# Patient Record
Sex: FEMALE | Race: WHITE | Employment: STUDENT | URBAN - METROPOLITAN AREA
[De-identification: names, ages, dates, MRNs, and addresses within clinical notes are randomized per-mention and may not be internally consistent; named-entity substitution may affect disease eponyms.]

---
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
## 2024-07-27 ENCOUNTER — TELEPHONE (OUTPATIENT)
Dept: FAMILY MEDICINE CLINIC | Facility: CLINIC | Age: 19
End: 2024-07-27

## 2025-02-06 DIAGNOSIS — F41.9 ANXIETY: ICD-10-CM

## 2025-02-11 RX ORDER — PROPRANOLOL HYDROCHLORIDE 10 MG/1
10 TABLET ORAL 2 TIMES DAILY
Qty: 180 TABLET | Refills: 0 | Status: SHIPPED | OUTPATIENT
Start: 2025-02-11

## 2025-02-11 NOTE — TELEPHONE ENCOUNTER
Please review; protocol failed/No Protocol    Patient comment: Bird Lucio - I would like a refill for this medication.  I am having a lot of anxiety at school.  My next appointment with you is in March when I am home for break.  Thank you     Last Office Visit: 05/24/2023  Future Appointments   Date Time Provider Department Center   4/18/2025  2:30 PM Aileen Dixon MD [facility]       Requested Prescriptions   Pending Prescriptions Disp Refills    propranolol 10 MG Oral Tab 180 tablet 1     Sig: Take 1 tablet (10 mg total) by mouth 2 (two) times daily.       Hypertension Medications Protocol Failed - 2/11/2025  9:55 AM        Failed - CMP or BMP in past 12 months        Failed - EGFRCR or GFRNAA > 50     GFR Evaluation            Passed - Last BP reading less than 140/90     BP Readings from Last 1 Encounters:   07/05/23 106/72               Passed - In person appointment or virtual visit in the past 12 mos or appointment in next 3 mos     Recent Outpatient Visits              1 year ago Vaginal odor    [facility] Domenica Huitron APRN    Office Visit    1 year ago Well adult exam    [facility] Aileen Dixon MD    Office Visit    3 years ago Healthy child on routine physical examination    [facility] Melissa Cross, DO    Office Visit    3 years ago Abdominal pain, unspecified abdominal location    [facility] [place], Michelle, DO    Office Visit    3 years ago [facility], [facility]    [facility]          Future Appointments         Provider Department Appt Notes    In 2 months Aileen Dixon MD [facility] annual physical                    Passed - Medication is  active on med list           Future Appointments         Provider Department Appt Notes    In 2 months Aileen Dixon MD [facility] annual physical          Recent Outpatient Visits              1 year ago Vaginal odor    [facility] Domenica Huitron, APRN    Office Visit    1 year ago Well adult exam    [facility] Aileen Dixon MD    Office Visit    3 years ago Healthy child on routine physical examination    [facility] Melissa Cross, DO    Office Visit    3 years ago Abdominal pain, unspecified abdominal location    [place], [place], Michelle, DO    Office Visit    3 years ago Abdominal pain, unspecified abdominal location    [place] [place], Michelle, DO    Office Visit

## 2025-04-18 ENCOUNTER — LAB ENCOUNTER (OUTPATIENT)
Dept: LAB | Age: 20
End: 2025-04-18
Attending: STUDENT IN AN ORGANIZED HEALTH CARE EDUCATION/TRAINING PROGRAM
Payer: COMMERCIAL

## 2025-04-18 ENCOUNTER — OFFICE VISIT (OUTPATIENT)
Dept: FAMILY MEDICINE CLINIC | Facility: CLINIC | Age: 20
End: 2025-04-18
Payer: COMMERCIAL

## 2025-04-18 VITALS
DIASTOLIC BLOOD PRESSURE: 81 MMHG | OXYGEN SATURATION: 97 % | HEART RATE: 79 BPM | BODY MASS INDEX: 34.23 KG/M2 | WEIGHT: 186 LBS | SYSTOLIC BLOOD PRESSURE: 120 MMHG | TEMPERATURE: 99 F | HEIGHT: 62 IN

## 2025-04-18 DIAGNOSIS — Z00.00 WELL ADULT EXAM: ICD-10-CM

## 2025-04-18 DIAGNOSIS — Z30.017 NEXPLANON INSERTION: ICD-10-CM

## 2025-04-18 DIAGNOSIS — Z00.00 WELL ADULT EXAM: Primary | ICD-10-CM

## 2025-04-18 DIAGNOSIS — Z80.8 FAMILY HISTORY OF THYROID CANCER: ICD-10-CM

## 2025-04-18 DIAGNOSIS — R10.13 DYSPEPSIA: ICD-10-CM

## 2025-04-18 DIAGNOSIS — F41.9 ANXIETY: ICD-10-CM

## 2025-04-18 DIAGNOSIS — Z30.09 ENCOUNTER FOR COUNSELING REGARDING CONTRACEPTION: ICD-10-CM

## 2025-04-18 LAB
ALBUMIN SERPL-MCNC: 5 G/DL (ref 3.2–4.8)
ALBUMIN/GLOB SERPL: 1.8 {RATIO} (ref 1–2)
ALP LIVER SERPL-CCNC: 82 U/L (ref 52–144)
ALT SERPL-CCNC: 17 U/L (ref 10–49)
ANION GAP SERPL CALC-SCNC: 12 MMOL/L (ref 0–18)
AST SERPL-CCNC: 19 U/L (ref ?–34)
BILIRUB SERPL-MCNC: 0.5 MG/DL (ref 0.3–1.2)
BUN BLD-MCNC: 12 MG/DL (ref 9–23)
BUN/CREAT SERPL: 14.6 (ref 10–20)
CALCIUM BLD-MCNC: 9.7 MG/DL (ref 8.7–10.4)
CHLORIDE SERPL-SCNC: 102 MMOL/L (ref 98–112)
CHOLEST SERPL-MCNC: 167 MG/DL (ref ?–200)
CO2 SERPL-SCNC: 25 MMOL/L (ref 21–32)
CONTROL LINE PRESENT WITH A CLEAR BACKGROUND (YES/NO): YES YES/NO
CREAT BLD-MCNC: 0.82 MG/DL (ref 0.55–1.02)
DEPRECATED RDW RBC AUTO: 39.2 FL (ref 35.1–46.3)
EGFRCR SERPLBLD CKD-EPI 2021: 105 ML/MIN/1.73M2 (ref 60–?)
ERYTHROCYTE [DISTWIDTH] IN BLOOD BY AUTOMATED COUNT: 12.3 % (ref 11–15)
EST. AVERAGE GLUCOSE BLD GHB EST-MCNC: 94 MG/DL (ref 68–126)
FASTING PATIENT LIPID ANSWER: YES
FASTING STATUS PATIENT QL REPORTED: YES
GLOBULIN PLAS-MCNC: 2.8 G/DL (ref 2–3.5)
GLUCOSE BLD-MCNC: 103 MG/DL (ref 70–99)
HBA1C MFR BLD: 4.9 % (ref ?–5.7)
HCT VFR BLD AUTO: 39.1 % (ref 35–48)
HDLC SERPL-MCNC: 47 MG/DL (ref 40–59)
HGB BLD-MCNC: 13.6 G/DL (ref 12–16)
KIT LOT #: NORMAL NUMERIC
LDLC SERPL CALC-MCNC: 99 MG/DL (ref ?–100)
MCH RBC QN AUTO: 30.3 PG (ref 26–34)
MCHC RBC AUTO-ENTMCNC: 34.8 G/DL (ref 31–37)
MCV RBC AUTO: 87.1 FL (ref 80–100)
NONHDLC SERPL-MCNC: 120 MG/DL (ref ?–130)
OSMOLALITY SERPL CALC.SUM OF ELEC: 288 MOSM/KG (ref 275–295)
PLATELET # BLD AUTO: 425 10(3)UL (ref 150–450)
POTASSIUM SERPL-SCNC: 4 MMOL/L (ref 3.5–5.1)
PROT SERPL-MCNC: 7.8 G/DL (ref 5.7–8.2)
RBC # BLD AUTO: 4.49 X10(6)UL (ref 3.8–5.3)
SODIUM SERPL-SCNC: 139 MMOL/L (ref 136–145)
THYROPEROXIDASE AB SERPL-ACNC: 50 U/ML (ref ?–60)
TRIGL SERPL-MCNC: 115 MG/DL (ref 30–149)
TSI SER-ACNC: 1.41 UIU/ML (ref 0.55–4.78)
VIT D+METAB SERPL-MCNC: 16.7 NG/ML (ref 30–100)
VLDLC SERPL CALC-MCNC: 19 MG/DL (ref 0–30)
WBC # BLD AUTO: 9.1 X10(3) UL (ref 4–11)

## 2025-04-18 PROCEDURE — 86376 MICROSOMAL ANTIBODY EACH: CPT

## 2025-04-18 PROCEDURE — 80053 COMPREHEN METABOLIC PANEL: CPT | Performed by: STUDENT IN AN ORGANIZED HEALTH CARE EDUCATION/TRAINING PROGRAM

## 2025-04-18 PROCEDURE — 99213 OFFICE O/P EST LOW 20 MIN: CPT | Performed by: STUDENT IN AN ORGANIZED HEALTH CARE EDUCATION/TRAINING PROGRAM

## 2025-04-18 PROCEDURE — 11981 INSERTION DRUG DLVR IMPLANT: CPT | Performed by: STUDENT IN AN ORGANIZED HEALTH CARE EDUCATION/TRAINING PROGRAM

## 2025-04-18 PROCEDURE — 84443 ASSAY THYROID STIM HORMONE: CPT | Performed by: STUDENT IN AN ORGANIZED HEALTH CARE EDUCATION/TRAINING PROGRAM

## 2025-04-18 PROCEDURE — 99395 PREV VISIT EST AGE 18-39: CPT | Performed by: STUDENT IN AN ORGANIZED HEALTH CARE EDUCATION/TRAINING PROGRAM

## 2025-04-18 PROCEDURE — 85027 COMPLETE CBC AUTOMATED: CPT | Performed by: STUDENT IN AN ORGANIZED HEALTH CARE EDUCATION/TRAINING PROGRAM

## 2025-04-18 PROCEDURE — 36415 COLL VENOUS BLD VENIPUNCTURE: CPT | Performed by: STUDENT IN AN ORGANIZED HEALTH CARE EDUCATION/TRAINING PROGRAM

## 2025-04-18 PROCEDURE — 82306 VITAMIN D 25 HYDROXY: CPT

## 2025-04-18 PROCEDURE — 81025 URINE PREGNANCY TEST: CPT | Performed by: STUDENT IN AN ORGANIZED HEALTH CARE EDUCATION/TRAINING PROGRAM

## 2025-04-18 PROCEDURE — 83036 HEMOGLOBIN GLYCOSYLATED A1C: CPT | Performed by: STUDENT IN AN ORGANIZED HEALTH CARE EDUCATION/TRAINING PROGRAM

## 2025-04-18 PROCEDURE — 80061 LIPID PANEL: CPT | Performed by: STUDENT IN AN ORGANIZED HEALTH CARE EDUCATION/TRAINING PROGRAM

## 2025-04-18 RX ORDER — OMEPRAZOLE 40 MG/1
40 CAPSULE, DELAYED RELEASE ORAL DAILY
Qty: 90 CAPSULE | Refills: 1 | Status: SHIPPED | OUTPATIENT
Start: 2025-04-18

## 2025-04-18 RX ORDER — BUPROPION HYDROCHLORIDE 150 MG/1
150 TABLET ORAL DAILY
Qty: 30 TABLET | Refills: 0 | Status: SHIPPED | OUTPATIENT
Start: 2025-04-18

## 2025-04-18 NOTE — PROGRESS NOTES
HPI:    Patient ID: Maura Gannon is a 19 year old female.    HPI  Pt presenting for routine physical exam. Denies any acute issues or recent illnesses. No significant chronic medical problems. Past medical/surgical history, family history, and social history were reviewed.     Interested in birth control options  No prior contraception    Mild GERD  Omeprazole daily  Triggered by acid, alcohol  Chronic nausea    H/o anxiety  Lexapro numb  Overwhelmed  No therapy  Journaling  Has friends  Going to bed/waking up early  Communications/graphic design  Denies SH/SI/HI      Review of Systems   A comprehensive 10 point review of systems was completed.  Pertinent positives and negatives noted in the the HPI.     Current Medications[1]  Allergies:Allergies[2]   Vitals:    04/18/25 1420   BP: 120/81   Pulse: 79   Temp: 98.7 °F (37.1 °C)   SpO2: 97%   Weight: 186 lb (84.4 kg)   Height: 5' 2\" (1.575 m)       Body mass index is 34.02 kg/m².   PHYSICAL EXAM:   Physical Exam  Vitals reviewed.   Constitutional:       General: She is not in acute distress.     Appearance: Normal appearance. She is well-developed.   HENT:      Head: Normocephalic and atraumatic.      Right Ear: Tympanic membrane, ear canal and external ear normal.      Left Ear: Tympanic membrane, ear canal and external ear normal.   Eyes:      Conjunctiva/sclera: Conjunctivae normal.   Neck:      Thyroid: No thyroid mass or thyroid tenderness.   Cardiovascular:      Rate and Rhythm: Normal rate and regular rhythm.      Pulses: Normal pulses.      Heart sounds: Normal heart sounds, S1 normal and S2 normal. No murmur heard.  Pulmonary:      Effort: Pulmonary effort is normal. No respiratory distress.      Breath sounds: Normal breath sounds. No wheezing, rhonchi or rales.   Abdominal:      General: Bowel sounds are normal.      Palpations: Abdomen is soft.      Tenderness: There is no abdominal tenderness. There is no guarding or rebound.   Musculoskeletal:       Cervical back: Normal range of motion and neck supple. No muscular tenderness.      Right lower leg: No edema.      Left lower leg: No edema.   Lymphadenopathy:      Cervical: No cervical adenopathy.   Skin:     General: Skin is warm and dry.      Coloration: Skin is not jaundiced.   Neurological:      General: No focal deficit present.      Mental Status: She is alert and oriented to person, place, and time. Mental status is at baseline.      Deep Tendon Reflexes: Reflexes normal.   Psychiatric:         Attention and Perception: Attention normal.         Mood and Affect: Mood normal.         Behavior: Behavior normal. Behavior is cooperative.         Cognition and Memory: Cognition normal.             ASSESSMENT/PLAN:   1. Well adult exam  Discussed preventative screenings  - will check labs as below  - encouraged to continue diet/exercise for overall wellness  - follow-up with eye doctor annually  - follow-up with dentist every 6 months  - return yearly for physicals  - annual flu shot  - tetanus booster every 10yrs  - TSH W Reflex To Free T4  - CBC, Platelet; No Differential  - Comp Metabolic Panel (14)  - Hemoglobin A1C  - Lipid Panel  - Vitamin D; Future    2. Dyspepsia  - discussed dietary modifications including avoiding caffeine, sodas, NSAID use, EtOH, and late night eating  - to increase water intake as able  - will start PPI trial  - to call with any worsened symptoms  - Omeprazole 40 MG Oral Capsule Delayed Release; Take 1 capsule (40 mg total) by mouth daily.  Dispense: 90 capsule; Refill: 1    3. Anxiety  Discussed treatment options, including counseling, medication, combination  - to start Bupropion -- discussed that medication takes 2-6 weeks to observe effect  - continue counseling  - to call with questions/concerns  - advised to proceed to ED if feeling unsafe at home  - provided with contact information for crisis line [phone]  - buPROPion  MG Oral Tablet 24 Hr; Take 1 tablet (150 mg  total) by mouth daily.  Dispense: 30 tablet; Refill: 0    4. Family history of thyroid cancer  Will check labs per request  - Thyroid Peroxidase (TPO) AB [E]; Future    5. Encounter for counseling regarding contraception  Pt counseled on contraception options, including implant, IUD options (Mirena and Paragard), Depo shot, patch, OCPs. Risks, benefits, indications and alternatives, as well as proper use and common side effects for all were reviewed. Specifically, irregular bleeding patterns and/or amenorrhea in Mirena and Nexplanon users, and possibly heavier and more painful menses in Paragard users were emphasized.   Insertion procedure was described.   Risks of uterine perforation, bleeding, infection especially in the first 21 days, IUD migration out of the uterus and/or expulsion after placement were mentioned. Pt is aware that IUDs and implants do not prevent STIs. Pt is also aware that she would need to use alternative birth control until her follow-up appointment. All questions were answered.     6. Nexplanon insertion  PRE-OP DIAGNOSIS: Patient desires long-term, reversible contraception.  POST-OP DIAGNOSIS: Same  PROCEDURE: Nexplanon placement  Performing Physician: Aileen Dixon MD  PROCEDURE:  -Written and verbal informed consent obtained, risks discussed included: bleeding, irregular menses, infection, pain/discomfort, cost for removal.  -The appropriate timeout was taken and the patient’s non-dominant hand was identified.  -Patient was instructed to lie supine on the examination table with her non-dominant arm flexed at the elbow and externally rotated so that her hand was underneath her head (or as close as possible).  -The insertion site was identified on the inner side of the non-dominant upper arm, 8 cm from medial epicondyle of the humerus and 4 cm posterior to the sulcus between the biceps and triceps muscles. The insertion site was marked with a sterile marker.  -A second spot (guiding sabine)  was made with the sterile marker 5 cm proximal to the sabine of the insertion site to serve as a direction guide during the Nexplanon insertion.  -The insertion site was confirmed as the correct location on the inner side of the arm.  -The skin was cleaned from the insertion site to the guiding sabine with an antiseptic solution (Betadine / Chloraprep) and draped in a sterile fashion.  -Anesthesia was achieved by injecting 2 mL of 1% lidocaine (without epinephrine) just under the skin along the planned insertion tunnel. Anesthesia confirmed.  -The skin was punctured with the tip of the Nexplanon trocar needle slightly angled less than 30°. The needle was inserted until the bevel was just under the skin.  -The applicator was then lowered to a horizontal position. While lifting the skin with the tip of the needle, the needle was inserted to its full length. Mild resistance was felt but no excessive force was used. The needle slid in easily.  -The applicator was kept in the same position with the needle inserted to its full length. The free hand was used to keep the applicator in the same position. Then the purple slider was unlocked by pushing it slightly down. The purple slider was then moved fully back until it stopped, delivering the Nexplanon capsule subcutaneously. The trocar was removed from the insertion site.  -Nexplanon capsule was palpated by provider and patient to assure satisfactory placement.  -A Bandage and icepack were applied to the area. Patient to keep the pressure dressing for 24 hrs and the bandage for 3-5 days.  -Anticipatory guidance, as well as standard post-procedure care, was explained.  -Return precautions are given. The patient tolerated the procedure well without complications.  -Estimated blood loss was less than 0.5 mL  - INSERTION, SUB-DERMAL CONTRACEPTIVE IMPLANT [26465]  - Etonogestrel IMPL 1 each  - POC Urine pregnancy test [21936]    Follow-up in 4-6 weeks for mood surveillance. Pt  verbalized understanding and agrees with plan.    Orders Placed This Encounter   Procedures    POC Urine pregnancy test [86975]    TSH W Reflex To Free T4    CBC, Platelet; No Differential    Comp Metabolic Panel (14)    Hemoglobin A1C    Lipid Panel    Vitamin D    Thyroid Peroxidase (TPO) AB [E]    INSERTION, SUB-DERMAL CONTRACEPTIVE IMPLANT [35199]       Meds This Visit:  Requested Prescriptions     Signed Prescriptions Disp Refills    Omeprazole 40 MG Oral Capsule Delayed Release 90 capsule 1     Sig: Take 1 capsule (40 mg total) by mouth daily.    buPROPion  MG Oral Tablet 24 Hr 30 tablet 0     Sig: Take 1 tablet (150 mg total) by mouth daily.       Imaging & Referrals:  None         ID#[id number]       [1]   Current Outpatient Medications   Medication Sig Dispense Refill    Omeprazole 40 MG Oral Capsule Delayed Release Take 1 capsule (40 mg total) by mouth daily. 90 capsule 1    buPROPion  MG Oral Tablet 24 Hr Take 1 tablet (150 mg total) by mouth daily. 30 tablet 0    propranolol 10 MG Oral Tab Take 1 tablet (10 mg total) by mouth 2 (two) times daily. 180 tablet 0    ergocalciferol 1.25 MG (66679 UT) Oral Cap Take 1 capsule (50,000 Units total) by mouth once a week. 24 capsule 0   [2] Not on File

## 2025-05-23 DIAGNOSIS — F41.9 ANXIETY: ICD-10-CM

## 2025-05-26 RX ORDER — BUPROPION HYDROCHLORIDE 150 MG/1
150 TABLET ORAL DAILY
Qty: 90 TABLET | Refills: 3 | Status: SHIPPED | OUTPATIENT
Start: 2025-05-26

## 2025-05-26 RX ORDER — BUPROPION HYDROCHLORIDE 150 MG/1
150 TABLET ORAL DAILY
Qty: 30 TABLET | Refills: 0 | OUTPATIENT
Start: 2025-05-26

## 2025-06-30 ENCOUNTER — APPOINTMENT (OUTPATIENT)
Dept: URBAN - METROPOLITAN AREA CLINIC 244 | Age: 20
Setting detail: DERMATOLOGY
End: 2025-07-02

## 2025-06-30 DIAGNOSIS — Q826 OTHER SPECIFIED ANOMALIES OF SKIN: ICD-10-CM

## 2025-06-30 DIAGNOSIS — L50.5 CHOLINERGIC URTICARIA: ICD-10-CM

## 2025-06-30 DIAGNOSIS — Q828 OTHER SPECIFIED ANOMALIES OF SKIN: ICD-10-CM

## 2025-06-30 DIAGNOSIS — Q819 OTHER SPECIFIED ANOMALIES OF SKIN: ICD-10-CM

## 2025-06-30 PROBLEM — L85.8 OTHER SPECIFIED EPIDERMAL THICKENING: Status: ACTIVE | Noted: 2025-06-30

## 2025-06-30 PROCEDURE — OTHER OTC TREATMENT REGIMEN: OTHER

## 2025-06-30 PROCEDURE — OTHER COUNSELING: OTHER

## 2025-06-30 ASSESSMENT — LOCATION DETAILED DESCRIPTION DERM
LOCATION DETAILED: LEFT PROXIMAL PRETIBIAL REGION
LOCATION DETAILED: RIGHT PROXIMAL PRETIBIAL REGION
LOCATION DETAILED: RIGHT DISTAL POSTERIOR THIGH

## 2025-06-30 ASSESSMENT — LOCATION ZONE DERM: LOCATION ZONE: LEG

## 2025-06-30 ASSESSMENT — LOCATION SIMPLE DESCRIPTION DERM
LOCATION SIMPLE: LEFT PRETIBIAL REGION
LOCATION SIMPLE: RIGHT PRETIBIAL REGION
LOCATION SIMPLE: RIGHT POSTERIOR THIGH

## 2025-07-05 ENCOUNTER — NURSE TRIAGE (OUTPATIENT)
Dept: FAMILY MEDICINE CLINIC | Facility: CLINIC | Age: 20
End: 2025-07-05
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
## 2025-08-09 DIAGNOSIS — F41.9 ANXIETY: ICD-10-CM

## 2025-08-09 RX ORDER — BUPROPION HYDROCHLORIDE 150 MG/1
150 TABLET ORAL DAILY
Qty: 90 TABLET | Refills: 3 | Status: CANCELLED | OUTPATIENT
Start: 2025-08-09

## (undated) NOTE — LETTER
VACCINE ADMINISTRATION RECORD  PARENT / GUARDIAN APPROVAL  Date: 8/10/2017  Vaccine administered to:  Abhinav Stoddard     : 2005    MRN: [id number]    A copy of the appropriate Centers for Disease Control and Prevention Vaccine Information statemen

## (undated) NOTE — LETTER
[facility] [employer] of Child Health Examination       Student's Name  Yamil Ruiz Signature                                                                                                                                   Title                           Date  08/10/17   Signature Female School   Grade Level/ID#  7th Grade   HEALTH HISTORY          TO BE COMPLETED AND SIGNED BY PARENT/GUARDIAN AND VERIFIED BY HEALTH CARE PROVIDER    ALLERGIES  (Food, drug, insect, other)  Review of patient's allergies indicates no known allergies.  Earl Linn PHYSICAL EXAMINATION REQUIREMENTS (head circumference if <33 years old):   /69   Pulse 85   Ht 5' 1\" (1.549 m)   Wt 53.1 kg (117 lb)   BMI 22.11 kg/m²     DIABETES SCREENING  BMI>85% age/sex  No And any two of the following:  Family History Yes Respiratory Yes                   Diagnosis of Asthma: No Mental Health Yes        Currently Prescribed Asthma Medication:            Quick-relief  medication (e.g. Short Acting Beta Antagonist): No          Controller medication (e.g. inhaled corticostero

## (undated) NOTE — LETTER
AUTHORIZATION FOR SURGICAL OPERATION OR OTHER PROCEDURE    1. I hereby authorize , and [facility] staff assigned to my case to perform the following operation and/or procedure at the [facility] site:  Nexplanon Insertion   _______________________________________________________________________________________________      _______________________________________________________________________________________________    2.  My physician has explained the nature and purpose of the operation or other procedure, possible alternative methods of treatment, the risks involved, and the possibility of complication to me.  I acknowledge that no guarantee has been made as to the result that may be obtained.  3.  I recognize that, during the course of this operation, or other procedure, unforseen conditions may necessitate additional or different procedure than those listed above.  I, therefore, further authorize and request that the above named physician, his/her physician assistants or designees perform such procedures as are, in his/her professional opinion, necessary and desirable.  4.  Any tissue or organs removed in the operation or other procedure may be disposed of by and at the discretion of the [facility] and [facility].  5.  I understand that in the event of a medical emergency, I will be transported by local paramedics to [facility] or other hospital emergency department.  6.  I certify that I have read and fully understand the above consent to operation and/or other procedure.    7.  I acknowledge that my physician has explained sedation/analgesia administration to me including the risks and benefits.  I consent to the administration of sedation/analgesia as may be necessary or desirable in the judgement of my physician.    Witness signature: ___________________________________________________ Date:  ______/______/_____                     Time:  ________ A.M.  P.M.       Patient Name:  ______________________________________________________  (please print)      Patient signature:  ___________________________________________________             Relationship to Patient:           []  Parent    Responsible person                          []  Spouse  In case of minor or                    [] Other  _____________   Incompetent name:  __________________________________________________                               (please print)      _____________      Responsible person  In case of minor or  Incompetent signature:  _______________________________________________    Statement of Physician  My signature below affirms that prior to the time of the procedure, I have explained to the patient and/or his/her guardian, the risks and benefits involved in the proposed treatment and any reasonable alternative to the proposed treatment.  I have also explained the risks and benefits involved in the refusal of the proposed treatment and have answered the patient's questions.                        Date:  ______/______/_______  Provider                      Signature:  __________________________________________________________       Time:  ___________ A.M    P.M.

## (undated) NOTE — MR AVS SNAPSHOT
[facility]  [facility], [address]  [phone]               Thank you for choosing us for your health care visit with Brett Carvalho DO.   We are glad to serve you and happy to provide you with this summ also just part of growing up. But if your teen is always depressed, you should be concerned.  Other signs of depression include:  · Use of drugs or alcohol  · Problems in school and at home  · Frequent episodes of running away  · Thoughts or talk of death o Sign up for [employer] access for your child. [employer] access allows you to view health information for your child from their recent   visit, view other health information and more.   To sign up or find more information on getting   Proxy Access to your child In addition to 5, 4, 3, 2, 1 families can make small changes in their family routines to help everyone lead healthier active lives.  Try:  o Eating breakfast everyday  o Eating low-fat dairy products like yogurt, milk, and cheese  o Regularly eating meals t

## (undated) NOTE — LETTER
4/22/2021              [address]        [address] [facility] [address]         To Whom it may concern: This is to certify that Valorie Langford had an appointment on 4/22/2021 at with Maryam Sylvester DO.   Dx:Gastritis without clinic

## (undated) NOTE — LETTER
Name:  Noemy Torres Year:  8th Grade Class: Student ID No.:   Address:  [address] [place] [address] Phone:  [phone] (home) [phone] (work) :  14year old   Name Relationship Lgl Ky Matthew 3 Work [employer] implanted defibrillator? 12. Has anyone in your family had unexplained fainting, seizures, or near drowning?      BONE AND JOINT QUESTIONS Yes No   17. Have you ever had an injury to a bone, muscle, ligament, or tendon that caused you to miss a practice 39.Have you ever been unable to move your arms / legs after being hit /fall? 40. Have you ever become ill while exercising in the heat?     41. Do you get frequent muscle cramps when exercising? 42.  Do you or someone in your family have sickle cell · Location of point of maximal impulse (PMI) Yes    Pulses Yes    Lungs Yes    Abdomen Yes    Genitourinary (males only)* N/A    Skin:  HSV, lesions suggestive of MRSA, tinea corporis Yes    Neurologic* Yes    MUSCULOSKELETAL     Neck Yes    Back Yes    Sh performance-enhancing substances in my/his/her body either during [employer] state series events or during the school day, and I/our student do/does hereby agree to submit to such testing and analysis by a certified laboratory.  We further understand and agree th

## (undated) NOTE — LETTER
Name:  Harriet [facility] Year:  10th Grade Class: Student ID No.:   Address[place]  [facility] [place] [address] Phone:  [phone] (home) [phone] (work) :  12year old   Name Relationship Lgl CtraJorge Matthew 3 Work Oxynade P syndrome, arrhythmogenic right ventricular cardiomyopathy, long QT syndrome, short QT syndrome, Brugada syndrome, or catecholaminergic polymorphic ventricular tachycardia?      13. Does anyone in your family have a heart problem, pacemaker, or implanted def 39. Do you have a history of seizure disorder?     37. Do you have headaches with exercise? 38. Have you ever had numbness, tingling, or weakness in your arms or legs after being hit or falling?      39.Have you ever been unable to move your arms / legs excavatum,      arachnodactyly, arm span > height, hyperlaxity, myopia, MVP, aortic insufficiency) Yes    Eyes/Ears/Nose/Throat:  Pupils equal    Hearing Yes    Lymph nodes Yes    Heart*  · Murmurs (auscultation standing, supine, +/- Valsalva)  · Location that I/our student will not use performance-enhancing substances as defined in the [facility] Performance-Enhancing Substance Testing Program Protocol.  We have reviewed the policy and understand that I/our student may be asked to submit to testing for the presen

## (undated) NOTE — LETTER
2021              Snehal Suarez         : 2005        [address] [facility]         To Whom it may concern: This is to certify that Snehal Erick had an appointment on 2021 at 3:00 PM with DO. Kailee Morales

## (undated) NOTE — LETTER
[facility] [employer] of Child Health Examination       Student's Name  Ila CAMPUZANO below.  Signature                                                                                                                              Title       DO                    Date  12/2021   Signature Sex  Female School   Grade Level/ID#  12th Grade   HEALTH HISTORY          TO BE COMPLETED AND SIGNED BY PARENT/GUARDIAN AND VERIFIED BY HEALTH CARE PROVIDER    ALLERGIES  (Food, drug, insect, other)  Patient has no known allergies.  MEDICATION  (List all p reading) Dental:  ____Braces    ____Bridge    ____Plate    ____Other  Other concerns? Ear/Hearing problems? Yes   No  Information may be shared with appropriate personnel for health /educational purposes. Bone/Joint problem/injury/scoliosis?    Yes Developmental Screening Tool     SYSTEM REVIEW Normal Comments/Follow-up/Needs  Normal Comments/Follow-up/Needs   Skin Yes  Endocrine Yes    Ears Yes                      Screen result: Gastrointestinal Yes    Eyes Yes     Screen result:   Genito-Urinary Y Lucrecia Norton  [address] [facility]  [address] [address]  [phone]   Rev 11/15                                                                    Printed by the [employer]

## (undated) NOTE — ED AVS SNAPSHOT
[facility] in [facility] [address] [place] [address] [address]    Phone:  [phone]    Fax:  [phone]           [facility]   MRN: [id number]    Department:  [facility] in [place]   Date of Visit:  2/4 benefit level being available to you or other limited reimbursement. The physician may seek payment directly from you for amounts other than your deductible, co-payment, or co-insurance and for other services not covered under your health insurance plan. If you believe that any of the medications or instructions on this list is different from what your Primary Care doctor has instructed you - please continue to take your medications as instructed by your Primary Care doctor until you can check with your do can help with your Affordable Care Act coverage, as well as all types of Medicaid plans. To get signed up and covered, please call [phone] and ask to get set up for an insurance coverage that is in-network with Varsha Oneill

## (undated) NOTE — LETTER
[facility] [employer] of Child Health Examination       Student's Name  Vernon Sterling D Signature                                                                                                                                   Title      DO                     Date  9/10/2018   Signature 11/29/2005  Sex  Female School   Grade Level/ID#  8th Grade   HEALTH HISTORY          TO BE COMPLETED AND SIGNED BY PARENT/GUARDIAN AND VERIFIED BY HEALTH CARE PROVIDER    ALLERGIES  (Food, drug, insect, other)  Patient has no known allergies.  MEDICATION /74   Pulse 85   Ht 5' 2\" (1.575 m)   Wt 59.4 kg (131 lb)   BMI 23.96 kg/m²     DIABETES SCREENING  BMI>85% age/sex  Yes And any two of the following:  Family History Yes    Ethnic Minority  No          Signs of Insulin Resistance (hypertension, dy Currently Prescribed Asthma Medication:            Quick-relief  medication (e.g. Short Acting Beta Antagonist): No          Controller medication (e.g. inhaled corticosteroid):   No Other   NEEDS/MODIFICATIONS required in the school setting  None DIET

## (undated) NOTE — LETTER
VACCINE ADMINISTRATION RECORD  PARENT / GUARDIAN APPROVAL  Date: 2021  Vaccine administered to:  Adelina Mckinley     : 2005    MRN: [id number]    A copy of the appropriate Centers for Disease Control and Prevention Vaccine Information statemen

## (undated) NOTE — LETTER
[facility] [employer] of Child Health Examination       Student's Name  Chitra Sterling D Signature                                                                                                                                   Title                           Date     Signature Female School   Grade Level/ID#  9th Grade   HEALTH HISTORY          TO BE COMPLETED AND SIGNED BY PARENT/GUARDIAN AND VERIFIED BY HEALTH CARE PROVIDER    ALLERGIES  (Food, drug, insect, other)  Patient has no known allergies.  MEDICATION  (List all prescri Bone/Joint problem/injury/scoliosis?    Yes   No  Parent/Guardian Signature                                          Date     PHYSICAL EXAMINATION REQUIREMENTS    Entire section below to be completed by MD/DO/APN/PA       PHYSICAL EXAMINATION REQUIREMENTS ( Ears Yes                      Screen result: Gastrointestinal Yes    Eyes Yes     Screen result:   Genito-Urinary Yes  LMP   Nose Yes  Neurological Yes    Throat Yes  Musculoskeletal Yes    Mouth/Dental Yes  Spinal examination Yes    Cardiovascular/HTN Yes Rev 11/15                                                                    Printed by the [employer]

## (undated) NOTE — LETTER
Date & Time: 2/7/2023, 4:43 PM  Patient: Angel Jenkins  Encounter Provider(s):    DMITRIY Suarez       To Whom It May Concern:    Angel Jenkins was seen and treated in our department on 2/7/2023. She can return to school on 2/9/2023. If you have any questions or concerns, please do not hesitate to call.        _____________________________  Irena Barnes.  Lou Graves

## (undated) NOTE — LETTER
Name:  Marilyn Tom Year:   Class: Student ID No.:   Address:  [place] [id number] Phone:  [phone] (home) [phone] (work) :  14year old   Name Relationship Lgl Ctra. Vipul 3 Work Phone Home Phone Mobile Phone   1. 15. Does anyone in your family have hypertrophic cardiomyopathy, Marfan syndrome, arrhythmogenic right ventricular cardiomyopathy, long QT syndrome, short QT syndrome, Brugada syndrome, or catecholaminergic polymorphic ventricular tachycardia?      15. Does 35. Have you ever had a hit or blow to the head that caused confusion, prolonged headache, or memory problems? 36. Do you have a history of seizure disorder?     37. Do you have headaches with exercise?      38. Have you ever had numbness, tingling, or Appearance:  Marfan stigmata (kyphoscoliosis, high-arched palate, pectus excavatum,      arachnodactyly, arm span > height, hyperlaxity, myopia, MVP, aortic insufficiency) Yes    Eyes/Ears/Nose/Throat:  Pupils equal    Hearing Yes    Lymph nodes Yes    Hea As a prerequisite to participation in [employer] athletic activities, we agree that I/our student will not use performance-enhancing substances as defined in the [facility] Performance-Enhancing Substance Testing Program Protocol.  We have reviewed the policy and under

## (undated) NOTE — LETTER
Name:  Gilda Monk Year:  7th Grade Class: Student ID No.:   Address:  [place] [address] Phone:  [phone] (home) [phone] (work) :  14year old   Name Relationship Lgl Ky Matthew 3 Work [employer] 15. Does anyone in your family have hypertrophic cardiomyopathy, Marfan syndrome, arrhythmogenic right ventricular cardiomyopathy, long QT syndrome, short QT syndrome, Brugada syndrome, or catecholaminergic polymorphic ventricular tachycardia?      15. Does 35. Have you ever had a hit or blow to the head that caused confusion, prolonged headache, or memory problems? 36. Do you have a history of seizure disorder?     37. Do you have headaches with exercise?      38. Have you ever had numbness, tingling, or Appearance:  Marfan stigmata (kyphoscoliosis, high-arched palate, pectus excavatum,      arachnodactyly, arm span > height, hyperlaxity, myopia, MVP, aortic insufficiency) Yes    Eyes/Ears/Nose/Throat:  Pupils equal    Hearing Yes    Lymph nodes Yes    Hea As a prerequisite to participation in [employer] athletic activities, we agree that I/our student will not use performance-enhancing substances as defined in the [facility] Performance-Enhancing Substance Testing Program Protocol.  We have reviewed the policy and under